# Patient Record
Sex: MALE | Race: WHITE | NOT HISPANIC OR LATINO
[De-identification: names, ages, dates, MRNs, and addresses within clinical notes are randomized per-mention and may not be internally consistent; named-entity substitution may affect disease eponyms.]

---

## 2017-04-29 ENCOUNTER — HOSPITAL (OUTPATIENT)
Dept: OTHER | Age: 22
End: 2017-04-29
Attending: EMERGENCY MEDICINE

## 2024-12-07 ENCOUNTER — APPOINTMENT (OUTPATIENT)
Dept: GENERAL RADIOLOGY | Facility: HOSPITAL | Age: 29
End: 2024-12-07
Attending: EMERGENCY MEDICINE
Payer: MEDICAID

## 2024-12-07 ENCOUNTER — TELEPHONE (OUTPATIENT)
Dept: NEUROLOGY | Facility: CLINIC | Age: 29
End: 2024-12-07

## 2024-12-07 ENCOUNTER — APPOINTMENT (OUTPATIENT)
Dept: CT IMAGING | Facility: HOSPITAL | Age: 29
End: 2024-12-07
Attending: EMERGENCY MEDICINE
Payer: MEDICAID

## 2024-12-07 ENCOUNTER — HOSPITAL ENCOUNTER (EMERGENCY)
Facility: HOSPITAL | Age: 29
Discharge: HOME | End: 2024-12-07
Attending: EMERGENCY MEDICINE
Payer: MEDICAID

## 2024-12-07 VITALS
HEART RATE: 93 BPM | OXYGEN SATURATION: 96 % | BODY MASS INDEX: 28 KG/M2 | SYSTOLIC BLOOD PRESSURE: 133 MMHG | HEIGHT: 71 IN | TEMPERATURE: 98 F | WEIGHT: 200 LBS | RESPIRATION RATE: 21 BRPM | DIASTOLIC BLOOD PRESSURE: 73 MMHG

## 2024-12-07 DIAGNOSIS — S01.01XA LACERATION OF SCALP, INITIAL ENCOUNTER: ICD-10-CM

## 2024-12-07 DIAGNOSIS — N30.00 ACUTE CYSTITIS WITHOUT HEMATURIA: ICD-10-CM

## 2024-12-07 DIAGNOSIS — R56.9 SEIZURE (HCC): Primary | ICD-10-CM

## 2024-12-07 DIAGNOSIS — S09.90XA INJURY OF HEAD, INITIAL ENCOUNTER: ICD-10-CM

## 2024-12-07 LAB
ALBUMIN SERPL-MCNC: 4.6 G/DL (ref 3.2–4.8)
ALBUMIN/GLOB SERPL: 1.8 {RATIO} (ref 1–2)
ALP LIVER SERPL-CCNC: 47 U/L
ALT SERPL-CCNC: 26 U/L
AMPHET UR QL SCN: NEGATIVE
ANION GAP SERPL CALC-SCNC: 19 MMOL/L (ref 0–18)
AST SERPL-CCNC: 26 U/L (ref ?–34)
BARBITURATES UR QL SCN: NEGATIVE
BENZODIAZ UR QL SCN: NEGATIVE
BILIRUB SERPL-MCNC: 0.8 MG/DL (ref 0.3–1.2)
BILIRUB UR QL: NEGATIVE
BUN BLD-MCNC: 10 MG/DL (ref 9–23)
BUN/CREAT SERPL: 7.2 (ref 10–20)
CALCIUM BLD-MCNC: 9.6 MG/DL (ref 8.7–10.4)
CANNABINOIDS UR QL SCN: NEGATIVE
CHLORIDE SERPL-SCNC: 107 MMOL/L (ref 98–112)
CLARITY UR: CLEAR
CO2 SERPL-SCNC: 14 MMOL/L (ref 21–32)
COCAINE UR QL: NEGATIVE
CREAT BLD-MCNC: 1.39 MG/DL
CREAT UR-SCNC: 114 MG/DL
EGFRCR SERPLBLD CKD-EPI 2021: 70 ML/MIN/1.73M2 (ref 60–?)
ETHANOL SERPL-MCNC: <3 MG/DL (ref ?–3)
FENTANYL UR QL SCN: NEGATIVE
GLOBULIN PLAS-MCNC: 2.6 G/DL (ref 2–3.5)
GLUCOSE BLD-MCNC: 116 MG/DL (ref 70–99)
GLUCOSE BLDC GLUCOMTR-MCNC: 105 MG/DL (ref 70–99)
GLUCOSE UR-MCNC: NORMAL MG/DL
GRAN CASTS #/AREA URNS LPF: PRESENT /LPF
LEUKOCYTE ESTERASE UR QL STRIP.AUTO: 75
MDMA UR QL SCN: NEGATIVE
METHADONE UR QL SCN: NEGATIVE
NITRITE UR QL STRIP.AUTO: NEGATIVE
OPIATES UR QL SCN: NEGATIVE
OSMOLALITY SERPL CALC.SUM OF ELEC: 290 MOSM/KG (ref 275–295)
OXYCODONE UR QL SCN: NEGATIVE
PCP UR QL SCN: NEGATIVE
PH UR: 6 [PH] (ref 5–8)
POTASSIUM SERPL-SCNC: 3.9 MMOL/L (ref 3.5–5.1)
PROT SERPL-MCNC: 7.2 G/DL (ref 5.7–8.2)
PROT UR-MCNC: 50 MG/DL
SODIUM SERPL-SCNC: 140 MMOL/L (ref 136–145)
SP GR UR STRIP: >1.03 (ref 1–1.03)
TROPONIN I SERPL HS-MCNC: <3 NG/L
UROBILINOGEN UR STRIP-ACNC: NORMAL

## 2024-12-07 PROCEDURE — 71260 CT THORAX DX C+: CPT | Performed by: EMERGENCY MEDICINE

## 2024-12-07 PROCEDURE — 99284 EMERGENCY DEPT VISIT MOD MDM: CPT | Performed by: OTHER

## 2024-12-07 PROCEDURE — 72125 CT NECK SPINE W/O DYE: CPT | Performed by: EMERGENCY MEDICINE

## 2024-12-07 PROCEDURE — 70450 CT HEAD/BRAIN W/O DYE: CPT | Performed by: EMERGENCY MEDICINE

## 2024-12-07 PROCEDURE — 74177 CT ABD & PELVIS W/CONTRAST: CPT | Performed by: EMERGENCY MEDICINE

## 2024-12-07 PROCEDURE — 71045 X-RAY EXAM CHEST 1 VIEW: CPT | Performed by: EMERGENCY MEDICINE

## 2024-12-07 RX ORDER — CYCLOBENZAPRINE HCL 10 MG
10 TABLET ORAL 3 TIMES DAILY PRN
Qty: 20 TABLET | Refills: 0 | Status: CANCELLED | OUTPATIENT
Start: 2024-12-07 | End: 2024-12-14

## 2024-12-07 RX ORDER — LEVETIRACETAM 500 MG/1
500 TABLET ORAL 2 TIMES DAILY
Qty: 60 TABLET | Refills: 0 | Status: CANCELLED | OUTPATIENT
Start: 2024-12-07 | End: 2025-01-06

## 2024-12-07 RX ORDER — IBUPROFEN 600 MG/1
600 TABLET, FILM COATED ORAL EVERY 8 HOURS PRN
Qty: 20 TABLET | Refills: 0 | Status: CANCELLED | OUTPATIENT
Start: 2024-12-07 | End: 2024-12-14

## 2024-12-07 RX ORDER — LEVETIRACETAM 500 MG/1
500 TABLET ORAL 2 TIMES DAILY
Qty: 60 TABLET | Refills: 0 | Status: SHIPPED | OUTPATIENT
Start: 2024-12-07 | End: 2025-01-06

## 2024-12-07 RX ORDER — ONDANSETRON 2 MG/ML
4 INJECTION INTRAMUSCULAR; INTRAVENOUS ONCE
Status: DISCONTINUED | OUTPATIENT
Start: 2024-12-07 | End: 2024-12-07

## 2024-12-07 RX ORDER — IBUPROFEN 600 MG/1
600 TABLET, FILM COATED ORAL EVERY 8 HOURS PRN
Qty: 20 TABLET | Refills: 0 | Status: SHIPPED | OUTPATIENT
Start: 2024-12-07 | End: 2024-12-14

## 2024-12-07 RX ORDER — CYCLOBENZAPRINE HCL 10 MG
10 TABLET ORAL 3 TIMES DAILY PRN
Qty: 20 TABLET | Refills: 0 | Status: SHIPPED | OUTPATIENT
Start: 2024-12-07 | End: 2024-12-14

## 2024-12-07 RX ORDER — ONDANSETRON 2 MG/ML
INJECTION INTRAMUSCULAR; INTRAVENOUS
Status: DISCONTINUED
Start: 2024-12-07 | End: 2024-12-07

## 2024-12-07 RX ORDER — CEPHALEXIN 500 MG/1
500 CAPSULE ORAL 3 TIMES DAILY
Qty: 21 CAPSULE | Refills: 0 | Status: SHIPPED | OUTPATIENT
Start: 2024-12-07 | End: 2024-12-14

## 2024-12-07 NOTE — DISCHARGE INSTRUCTIONS
Take all medications as prescribed.  Start taking Keppra the seizure medicine to take twice a day daily.  Follow-up with your primary care provider in 2 days for wound check.  Staples need to be removed in 7 to 10 days.  Follow-up with neurology as soon as possible, call to schedule appointment.  Return to the ER if symptoms continue, get worse, unable to follow-up        Central Scheduling#: [phone]      Please follow up by calling Central Scheduling to schedule test entered by (Neurology) Dr. Alcala-  # [phone]  Open Monday- Friday 7:00am-6:00PM

## 2024-12-07 NOTE — ED PROVIDER NOTES
Patient Seen in: Health system Emergency Department    History     Chief Complaint   Patient presents with    Trauma 1 & 2       HPI    29-year-old male who is brought in by EMS after head injury.  Coworkers at the bedside and came with the patient.  He states they were talking he turned around for second and when he turned back around the patient was on the ground which looked like having seizure activity.  Patient did suffer a laceration to the top of his head.  Patient has no history of seizures but was initially altered when EMS arrived.  Started come to and was able answer questions by the time he arrived in the ER.  Coworker has a video from work and showed that the patient actually went into his seizure and fell backwards and hit the back of his head on the ground.  Patient's brother patient's had a seizure-like this before when using marijuana in the past approximately 4 years ago no other seizures since then    History reviewed. No past medical history on file.    History reviewed. No past surgical history on file.      Medications :  Prescriptions Prior to Admission[1]     No family history on file.    Smoking Status:   Social History     Socioeconomic History    Marital status: Single       Constitutional and vital signs reviewed.      Social History and Family History elements reviewed from today, pertinent positives to the presenting problem noted.    Physical Exam     ED Triage Vitals [12/07/24 1231]   /76   Pulse (!) 154   Resp 21   Temp 98 °F (36.7 °C)   Temp src Temporal   SpO2 95 %   O2 Device None (Room air)       All measures to prevent infection transmission during my interaction with the patient were taken. Handwashing was performed prior to and after the exam.  Stethoscope and any equipment used during my examination was cleaned with super sani-cloth germicidal wipes following the exam.     Physical Exam  Vitals and nursing note reviewed.   HENT:      Head: Normocephalic.       Comments: Patient the top of the scalp approximately 2.5 cm in length, linear  Eyes:      Pupils: Pupils are equal, round, and reactive to light.   Neck:      Comments: C-collar in place upon arrival c-collar on palpated midline spine and there is some tenderness with palpation  Cardiovascular:      Rate and Rhythm: Tachycardia present.   Pulmonary:      Breath sounds: Normal breath sounds.   Abdominal:      Palpations: Abdomen is soft.   Musculoskeletal:         General: Normal range of motion.   Skin:     General: Skin is warm and dry.      Capillary Refill: Capillary refill takes less than 2 seconds.   Neurological:      General: No focal deficit present.      Mental Status: He is alert.         ED Course        Labs Reviewed   CBC WITH DIFFERENTIAL WITH PLATELET - Abnormal; Notable for the following components:       Result Value    WBC 13.6 (*)     RBC 6.29 (*)     HCT 53.8 (*)     .0 (*)     All other components within normal limits   COMP METABOLIC PANEL (14) - Abnormal; Notable for the following components:    Glucose 116 (*)     CO2 14.0 (*)     Anion Gap 19 (*)     Creatinine 1.39 (*)     BUN/CREA Ratio 7.2 (*)     All other components within normal limits   URINALYSIS, ROUTINE - Abnormal; Notable for the following components:    Spec Gravity >1.030 (*)     Ketones Urine Trace (*)     Blood Urine 1+ (*)     Protein Urine 50 (*)     Leukocyte Esterase Urine 75 (*)     WBC Urine 21-50 (*)     RBC Urine 6-10 (*)     Bacteria Urine Rare (*)     Granular Casts Present (*)     All other components within normal limits   POCT GLUCOSE - Abnormal; Notable for the following components:    POC Glucose  105 (*)     All other components within normal limits   ETHYL ALCOHOL - Normal   TROPONIN I HIGH SENSITIVITY - Normal   DRUG ABUSE PANEL 11 SCREEN   MD BLOOD SMEAR CONSULT   TRICH VAG BY JAJA   RAINBOW DRAW LAVENDER   RAINBOW DRAW LIGHT GREEN   RAINBOW DRAW BLUE   RAINBOW DRAW GOLD   CHLAMYDIA/GONOCOCCUS, JAJA      EKG    Rate, intervals and axes as noted on EKG Report.  Rate: 148  Rhythm: Sinus tachycardia  Reading: Sinus tachycardia, heart rate 148, normal intervals, normal axis           As Interpreted by me    Imaging Results Available and Reviewed while in ED: CT CHEST+ABDOMEN+PELVIS(ALL CNTRST ONLY)(CPT=71260/05152)    Result Date: 12/7/2024  CONCLUSION:  1. No acute posttraumatic injury of the thorax.  2. No evidence of acute posttraumatic visceral intra-abdominal solid organ injury, pneumoperitoneum, or hemoperitoneum.  3. Lesser incidental findings as above.    Dictated by (CST): Yogi Gonzalez MD on 12/07/2024 at 1:17 PM     Finalized by (CST): Yogi Gonzalez MD on 12/07/2024 at 1:26 PM          CT SPINE CERVICAL (CPT=72125)    Result Date: 12/7/2024  CONCLUSION:  1. No acute fracture or posttraumatic malalignment cervical spine.  2. Lesser incidental findings as above.    Dictated by (CST): Yogi Gonzalez MD on 12/07/2024 at 1:12 PM     Finalized by (CST): Yogi Gonzalez MD on 12/07/2024 at 1:13 PM          CT BRAIN OR HEAD (CPT=70450)    Result Date: 12/7/2024  CONCLUSION:  Large subgaleal hematoma at the cranial vertex without evidence of underlying calvarial fracture, coup/contrecoup intraparenchymal contusion, or intracranial hemorrhage.    Dictated by (CST): Yogi Gonzalez MD on 12/07/2024 at 1:09 PM     Finalized by (CST): Yogi Gonzalez MD on 12/07/2024 at 1:12 PM          XR CHEST AP PORTABLE  (CPT=71045)    Result Date: 12/7/2024  CONCLUSION: No acute cardiopulmonary process.    Dictated by (CST): Denton Jose MD on 12/07/2024 at 12:59 PM     Finalized by (CST): Denton Jose MD on 12/07/2024 at 12:59 PM         ED Medications Administered:   Medications   ondansetron (Zofran) 4 MG/2ML injection 4 mg (0 mg Intravenous Hold 12/7/24 1257)   sodium chloride 0.9 % IV bolus 1,000 mL (1,000 mL Intravenous New Bag 12/7/24 1340)   Tetanus-Diphth-Acell Pertussis (Tdap) (Boostrix) injection 0.5 mL  (0.5 mL Intramuscular Given 12/7/24 1316)   sodium chloride 0.9 % IV bolus 1,000 mL (0 mL Intravenous Stopped 12/7/24 1330)   iopamidol 76% (ISOVUE-370) injection for power injector (80 mL Intravenous Given 12/7/24 1303)         MDM     Vitals:    12/07/24 1303 12/07/24 1315 12/07/24 1334 12/07/24 1400   BP: 141/78 141/78 131/85 141/79   Pulse: (!) 126 (!) 124 109 113   Resp: 13 21 21 20   Temp:       TempSrc:       SpO2: 96% 96% 97% 95%   Weight:       Height:         *I personally reviewed and interpreted all ED vitals.    Pulse Ox: 95%, Room air, Normal     Monitor Interpretation:   sinus tachycardia as interpreted by me.  The cardiac monitor was ordered monitor cardiac rate and rhythm.    Differential Diagnosis/ Diagnostic Considerations: ICH, fracture, pneumothorax, blunt cardiac injury    Complicating Factors: The patient already has does not have any pertinent problems on file. to contribute to the complexity of this ED evaluation.    Medical Decision Making  Amount and/or Complexity of Data Reviewed  Independent Historian:      Details: Coworker at the bedside who brought the video of what happened to the patient was filled at work.  Family is also at the bedside  Labs: ordered. Decision-making details documented in ED Course.  Radiology: ordered and independent interpretation performed. Decision-making details documented in ED Course.  ECG/medicine tests: ordered and independent interpretation performed. Decision-making details documented in ED Course.  Discussion of management or test interpretation with external provider(s): Neurology    Risk  OTC drugs.  Prescription drug management.      Reviewed laboratory results and they were nothing acute.  I reviewed chest x-ray which is no acute process.  CT shows no acute injury of the head, C-spine chest abdomen pelvis.  I explained the patient will need to place staples to the laceration of his scalp.  Patient agrees with this plan.    Verbal consent was  obtained from the patient.  The 2.5 cm laceration located scalp was anesthetized in the usual fashion. The wound was scrubbed, draped and explored.   There were no deep structures involved.  No connective tissue injury noted.  The wound was repaired with 6 staples.  The wound repair was simple.  The procedure was performed by myself.     Patient having a seizure I did consult neurology Dr. Samayoa, evaluated the patient starting Keppra along with he is unaware patient have an outpatient MRI and EEG.  Placed the central scheduling number in his discharge.  Patient understands to call to get those test done and then follow-up with neurology after he has blood test done.  Given the number to neurology clinic as well.  Patient understands to take all medications as prescribed.  Also had a slight UTI so given antibiotics along with medication muscle laxer's for the pain.  Started patient on Keppra twice a day.  follow-up with your primary care provider 1 to 2 days.  return to the  ER if symptoms continue, get worse, unable to follow-up  Condition upon leaving the department: Stable    Disposition and Plan     Clinical Impression:  1. Seizure (HCC)    2. Injury of head, initial encounter    3. Laceration of scalp, initial encounter    4. Acute cystitis without hematuria        Disposition:  Discharge    Follow-up:  Rasta Collins MD  [address] [facility] [address]  [facility] [id number] [phone]    Call      Carlo Alcala MD  [address] [facility] [address] [place] [address] [id number] [phone]    Follow up        Medications Prescribed:  Current Discharge Medication List        START taking these medications    Details   cephALEXin 500 MG Oral Cap Take 1 capsule (500 mg total) by mouth 3 (three) times daily for 7 days.  Qty: 21 capsule, Refills: 0      ibuprofen 600 MG Oral Tab Take 1 tablet (600 mg total) by mouth every 8 (eight) hours as needed for Pain or Fever.  Qty: 20 tablet, Refills: 0      cyclobenzaprine 10 MG Oral Tab  Take 1 tablet (10 mg total) by mouth 3 (three) times daily as needed for Muscle spasms.  Qty: 20 tablet, Refills: 0      levETIRAcetam 500 MG Oral Tab Take 1 tablet (500 mg total) by mouth 2 (two) times daily.  Qty: 60 tablet, Refills: 0                              [1] (Not in a hospital admission)

## 2024-12-07 NOTE — ED INITIAL ASSESSMENT (HPI)
Received pt via EMS. 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 Pt was found on the ground supine. Pt was altered upon arrival not participating in assessment. Pt c/o chest pain. Pt has a laceration. Per coworker \"He looked like he may have been shaking\". Pt has a laceration to top of head.

## 2024-12-07 NOTE — CM/SW NOTE
Call from Dr. Perkins re: Central Scheduling for Neuro follow up requested by Dr. Alcala. Phone number [phone] added to AVS. Open  M-F: 7a-6pm    Myriam CHANDLER, 12/07/24, 2:18 PM

## 2024-12-07 NOTE — CONSULTS
[facility] NEUROSCIENCES [address], [address]  [facility] [address]  [phone]            Talya Beckham Patient Status:  Emergency    10/9/1995 MRN [id number]   Location [facility] EMERGENCY DEPARTMENT Attending Alistair Perkins DO   Hosp Day # 0 PCP None Pcp     Date of Admission:  2024  Date of Consult:  2024  Reason for Consultation:   Seizure    History of Present Illness:   Patient is a 29 year old male who was admitted to the hospital for <principal problem not specified>:  The patient presents with a chief complaint of a recent seizure, the head the video, patient was standing in a warehouse, when suddenly he stiffened up, fell backwards, hit his back of the head, his arms and legs were raised up and she was having tonic event.  He reports feeling dizzy prior the event, otherwise he denied any other aura.    The patient has a history of another episode five years ago, which he believes was triggered by consuming a weed gummy, the patient experienced fear of dying but did not have convulsions or loss of consciousness..     He denies any other prior history, such as drugs, alcohol, or severe head trauma. The patient admits to consuming 2-3 energy drinks daily. He denies any history of meningitis, encephalitis, brain surgeries, brain tumors, strokes. The patient is 29 years old and was born without any complications or delayed labor.         Past Medical History  No past medical history on file.    Past Surgical History  No past surgical history on file.    Family History  No family history on file.    Social History  Pediatric History   Patient Parents    Not on file     Other Topics Concern    Not on file   Social History Narrative    Not on file           Current Medications:  Current Facility-Administered Medications   Medication Dose Route Frequency    ondansetron (Zofran) 4 MG/2ML injection 4 mg  4 mg Intravenous Once    sodium chloride 0.9 % IV bolus  1,000 mL  1,000 mL Intravenous Once     (Not in a hospital admission)      Allergies  Allergies[1]    Review of Systems:   As in HPI, the rest of the 14 system review was done and was negative    Physical Exam:     Vitals:    12/07/24 1301 12/07/24 1303 12/07/24 1315 12/07/24 1334   BP: 136/57 141/78 141/78 131/85   Pulse: (!) 127 (!) 126 (!) 124 109   Resp: 16 13 21 21   Temp:       TempSrc:       SpO2: 94% 96% 96% 97%   Weight:       Height:           General: No apparent distress, well nourished, well groomed.  Head- Normocephalic, atraumatic  Eyes- No redness or swelling  ENT- Hearing intake, smell preserved, normal glutition  Neck- No masses or adenopathy  Cv: pulses were palpable and normal, no cyanosis or edema     Neurological:     Mental Status- Alert and oriented x3.  Normal attention span and concentration  Thought process intact  Memory intact- recent and remote  Mood intact  Fund of knowledge appropriate for education and age    Language intact including: comprehension, naming, repetition, vocabulary    Cranial Nerves:  V. Facial sensation intact  VII. Face symmetric, no facial weakness  VIII. Hearing intact.  IX. Pallet elevates symmetrically.  XI. Shoulder shrug is intact  XII. Tongue is midline, some bite marks on both sides of the tongue    Motor Exam:  Muscle tone normal  No atrophy or fasciculations  Strength- upper extremities 5/5 proximally and distally                  - lower  extremities 5/5 proximally and distally    Sensory Exam:  Light touch sensation- intact in all 4 extremities    Coordination:  Finger to nose intact  Rapid alternating movements intact      Results:     Laboratory Data:  Lab Results   Component Value Date    WBC 13.6 (H) 12/07/2024    HGB 17.3 12/07/2024    HCT 53.8 (H) 12/07/2024    .0 (H) 12/07/2024    CREATSERUM 1.39 (H) 12/07/2024    BUN 10 12/07/2024     12/07/2024    K 3.9 12/07/2024     12/07/2024    CO2 14.0 (L) 12/07/2024     (H)  12/07/2024    CA 9.6 12/07/2024    ALB 4.6 12/07/2024    ALKPHO 47 12/07/2024    TP 7.2 12/07/2024    AST 26 12/07/2024    ALT 26 12/07/2024    ETOH <3 12/07/2024         Imaging:    CT CHEST+ABDOMEN+PELVIS(ALL CNTRST ONLY)(CPT=71260/61529)    Result Date: 12/7/2024  CONCLUSION:  1. No acute posttraumatic injury of the thorax.  2. No evidence of acute posttraumatic visceral intra-abdominal solid organ injury, pneumoperitoneum, or hemoperitoneum.  3. Lesser incidental findings as above.    Dictated by (CST): Yogi Gonzalez MD on 12/07/2024 at 1:17 PM     Finalized by (CST): Yogi Gonzalez MD on 12/07/2024 at 1:26 PM          CT SPINE CERVICAL (CPT=72125)    Result Date: 12/7/2024  CONCLUSION:  1. No acute fracture or posttraumatic malalignment cervical spine.  2. Lesser incidental findings as above.    Dictated by (CST): Yogi Gonzalez MD on 12/07/2024 at 1:12 PM     Finalized by (CST): Yogi Gonzalez MD on 12/07/2024 at 1:13 PM          CT BRAIN OR HEAD (CPT=70450)    Result Date: 12/7/2024  CONCLUSION:  Large subgaleal hematoma at the cranial vertex without evidence of underlying calvarial fracture, coup/contrecoup intraparenchymal contusion, or intracranial hemorrhage.    Dictated by (CST): Yogi Gonzalez MD on 12/07/2024 at 1:09 PM     Finalized by (CST): Yogi Gonzalez MD on 12/07/2024 at 1:12 PM          XR CHEST AP PORTABLE  (CPT=71045)    Result Date: 12/7/2024  CONCLUSION: No acute cardiopulmonary process.    Dictated by (CST): Denton Jose MD on 12/07/2024 at 12:59 PM     Finalized by (CST): Denton Jose MD on 12/07/2024 at 12:59 PM         EKG    Result Date: 12/7/2024  Sinus tachycardia Rightward axis T wave abnormality, consider inferior ischemia Abnormal ECG No previous ECGs found in [facility]       Impression:      Patient with 1 episode of seizure.  The prior event was apparently was relating to intoxication described as fear of dying but no convulsions or loss of consciousness.  I discussed  50-50 percent chance of recurrence of seizures.  MRI of the brain and EEG will be ordered on patient basis.  If those are not revealing then patient will stop using marijuana and energy drinks.    Patient was offered to start antiepileptic therapy even right now but patient decided against it at this time.    Seizure precautions were discussed.    Antiepileptic drug (AED) therapy is generally reserved for patients who are at increased risk forrecurrent seizures.    AED treatment is generally started after the second seizure because seizure recurrence  indicates that the patient has a substantially increased risk for additional seizures. Prospective, randomized trials of individuals with a first unprovoked seizure estimate the two-year recurrence risk in untreated patients to be 40 to 50 percent.     The risk of recurrence is highest immediately after the first seizure and diminishes with time; 80 to 90 percent of patients who have recurrent seizures do so within two years.    These and other studies have identified three high risk features for seizure recurrence after a first unprovoked seizure:  Epileptiform abnormalities on EEG.  Remote symptomatic cause, as identified by clinical history or neuroimaging (eg, brain tumor,brain malformation). Acute symptomatic seizures have a lower risk for subsequent epilepsy.  Abnormal neurologic examination, including focal findings and intellectual disability.      Thank you for allowing me to participate in the care of your patient.    Carlo Alcala MD  12/7/2024           [1] No Known Allergies

## 2024-12-08 LAB
ATRIAL RATE: 148 BPM
P AXIS: 52 DEGREES
P-R INTERVAL: 122 MS
Q-T INTERVAL: 280 MS
QRS DURATION: 90 MS
QTC CALCULATION (BEZET): 439 MS
R AXIS: 95 DEGREES
T AXIS: -7 DEGREES
VENTRICULAR RATE: 148 BPM

## 2024-12-09 LAB
BASOPHILS # BLD AUTO: 0.11 X10(3) UL (ref 0–0.2)
BASOPHILS NFR BLD AUTO: 0.8 %
C TRACH DNA SPEC QL NAA+PROBE: NEGATIVE
DEPRECATED RDW RBC AUTO: 42.5 FL (ref 35.1–46.3)
EOSINOPHIL # BLD AUTO: 0.08 X10(3) UL (ref 0–0.7)
EOSINOPHIL NFR BLD AUTO: 0.6 %
ERYTHROCYTE [DISTWIDTH] IN BLOOD BY AUTOMATED COUNT: 13.5 % (ref 11–15)
HCT VFR BLD AUTO: 53.8 %
HGB BLD-MCNC: 17.3 G/DL
IMM GRANULOCYTES # BLD AUTO: 0.23 X10(3) UL (ref 0–1)
IMM GRANULOCYTES NFR BLD: 1.7 %
LYMPHOCYTES # BLD AUTO: 4.6 X10(3) UL (ref 1–4)
LYMPHOCYTES NFR BLD AUTO: 33.9 %
MCH RBC QN AUTO: 27.5 PG (ref 26–34)
MCHC RBC AUTO-ENTMCNC: 32.2 G/DL (ref 31–37)
MCV RBC AUTO: 85.5 FL
MONOCYTES # BLD AUTO: 0.98 X10(3) UL (ref 0.1–1)
MONOCYTES NFR BLD AUTO: 7.2 %
N GONORRHOEA DNA SPEC QL NAA+PROBE: NEGATIVE
NEUTROPHILS # BLD AUTO: 7.55 X10 (3) UL (ref 1.5–7.7)
NEUTROPHILS # BLD AUTO: 7.55 X10(3) UL (ref 1.5–7.7)
NEUTROPHILS NFR BLD AUTO: 55.8 %
PLATELET # BLD AUTO: 464 10(3)UL (ref 150–450)
RBC # BLD AUTO: 6.29 X10(6)UL
WBC # BLD AUTO: 13.6 X10(3) UL (ref 4–11)

## 2024-12-09 NOTE — TELEPHONE ENCOUNTER
Phone call placed to pt. RN advised pt of nature of phone call. Pt states that he would like to be transferred to central scheduling to set up his EEG and MRI. Done.

## 2024-12-11 LAB — TRICH VAG NAA: NEGATIVE
